# Patient Record
Sex: FEMALE | Race: BLACK OR AFRICAN AMERICAN | NOT HISPANIC OR LATINO | Employment: STUDENT | URBAN - METROPOLITAN AREA
[De-identification: names, ages, dates, MRNs, and addresses within clinical notes are randomized per-mention and may not be internally consistent; named-entity substitution may affect disease eponyms.]

---

## 2018-10-06 ENCOUNTER — HOSPITAL ENCOUNTER (EMERGENCY)
Facility: OTHER | Age: 18
Discharge: HOME | End: 2018-10-06
Attending: EMERGENCY MEDICINE
Payer: MEDICAID

## 2018-10-06 VITALS
OXYGEN SATURATION: 99 % | TEMPERATURE: 99 F | HEIGHT: 66 IN | RESPIRATION RATE: 16 BRPM | HEART RATE: 90 BPM | BODY MASS INDEX: 27.8 KG/M2 | WEIGHT: 173 LBS | DIASTOLIC BLOOD PRESSURE: 66 MMHG | SYSTOLIC BLOOD PRESSURE: 102 MMHG

## 2018-10-06 DIAGNOSIS — F12.920: Primary | ICD-10-CM

## 2018-10-06 LAB
B-HCG UR QL: NEGATIVE
CTP QC/QA: YES
GLUCOSE SERPL-MCNC: 130 MG/DL (ref 70–110)
POCT GLUCOSE: 130 MG/DL (ref 70–110)

## 2018-10-06 PROCEDURE — 25000003 PHARM REV CODE 250: Performed by: EMERGENCY MEDICINE

## 2018-10-06 PROCEDURE — 99283 EMERGENCY DEPT VISIT LOW MDM: CPT

## 2018-10-06 PROCEDURE — 81025 URINE PREGNANCY TEST: CPT | Performed by: EMERGENCY MEDICINE

## 2018-10-06 PROCEDURE — 82962 GLUCOSE BLOOD TEST: CPT

## 2018-10-06 RX ORDER — ONDANSETRON 2 MG/ML
4 INJECTION INTRAMUSCULAR; INTRAVENOUS
Status: DISCONTINUED | OUTPATIENT
Start: 2018-10-06 | End: 2018-10-06

## 2018-10-06 RX ORDER — ONDANSETRON 8 MG/1
8 TABLET, ORALLY DISINTEGRATING ORAL
Status: COMPLETED | OUTPATIENT
Start: 2018-10-06 | End: 2018-10-06

## 2018-10-06 RX ADMIN — ONDANSETRON 8 MG: 8 TABLET, ORALLY DISINTEGRATING ORAL at 01:10

## 2018-10-06 NOTE — ED NOTES
Pt lying in bed comfortably, call bell within reach, respirations even, unlabored, NAD noted, eyes open spontaneously. Pt updated on plan of care, will continue to monitor with cardiac monitoring, pulse ox, and bp cycling

## 2018-10-06 NOTE — ED TRIAGE NOTES
"Pt arrives to ED with c/o nausea and vomiting. Pt states "i ate cheese its, I was feeling fine before I ate it. My chest is burning, my mouth feels dry. I have been vomiting. I ate them 4 hours ago.i feel lightheaded and weird."  EMS reports pt eating TCH. PT vomiting in [place]. Pt lying in bed, respirations even, unlabored, eyes open spontaneously, pt able to speak in complete sentences. PT AAOx4, placed on pulse ox and BP cycling.   "

## 2018-10-06 NOTE — ED PROVIDER NOTES
"Encounter Date: 10/6/2018    SCRIBE #1 NOTE: I, Jennifer Krishnamurthy, am scribing for, and in the presence of, Dr. Gomez.       History     Chief Complaint   Patient presents with    Anxiety     Pt came to the ED tonight c.o. of anxiety attack s/p eating edibles tonight.      Time seen by provider: 12:50 AM    This is a 18 y.o. female who presents with complaint of N/V since eating "edible" cheez-its four hours ago. She reports dry mouth, "burning" chest pain, and light headedness. No SOB. She denies use of edibles in the past.      The history is provided by the patient.     Review of patient's allergies indicates:  No Known Allergies  Past Medical History:   Diagnosis Date    Asthma      History reviewed. No pertinent surgical history.  History reviewed. No pertinent family history.  Social History     Tobacco Use    Smoking status: Never Smoker   Substance Use Topics    Alcohol use: No    Drug use: Not on file     Review of Systems   Constitutional: Negative for fever.        Positive for dry mouth.   HENT: Negative for sore throat.    Respiratory: Negative for shortness of breath.    Cardiovascular: Positive for chest pain.   Gastrointestinal: Positive for nausea and vomiting. Negative for abdominal pain.   Genitourinary: Negative for dysuria.   Musculoskeletal: Negative for back pain.   Skin: Negative for rash.   Neurological: Positive for light-headedness. Negative for weakness.   Hematological: Does not bruise/bleed easily.       Physical Exam     Initial Vitals [10/06/18 0036]   BP Pulse Resp Temp SpO2   137/78 (!) 111 18 98.4 °F (36.9 °C) 100 %      MAP       --         Physical Exam    Nursing note and vitals reviewed.  Constitutional: She appears well-developed and well-nourished. She is not diaphoretic. No distress.   HENT:   Head: Normocephalic and atraumatic.   Eyes: EOM are normal. No scleral icterus.   Neck: Normal range of motion. Neck supple.   Cardiovascular: Normal rate, regular rhythm and normal " "heart sounds. Exam reveals no gallop and no friction rub.    No murmur heard.  Pulmonary/Chest: Breath sounds normal. No respiratory distress. She has no wheezes. She has no rhonchi. She has no rales.   Abdominal: Soft. Bowel sounds are normal. She exhibits no distension. There is no tenderness. There is no rebound and no guarding.   Musculoskeletal: Normal range of motion. She exhibits no edema or tenderness.   Lymphadenopathy:     She has no cervical adenopathy.   Neurological: She is alert and oriented to person, place, and time. GCS eye subscore is 4. GCS verbal subscore is 5. GCS motor subscore is 6.   Skin: Skin is warm and dry. No rash noted. No erythema.         ED Course   Procedures  Labs Reviewed   POCT GLUCOSE - Abnormal; Notable for the following components:       Result Value    POCT Glucose 130 (*)     All other components within normal limits   POCT URINE PREGNANCY   POCT GLUCOSE MONITORING CONTINUOUS          Imaging Results    None          Medical Decision Making:   Clinical Tests:   Lab Tests: Ordered and Reviewed    Additional MDM:   Comments: 18-year-old healthy female presents tachycardic via EMS for evaluation status post nausea vomiting after eating "edibles" for the 1st time tonight.  She reports being or other baseline prior to this.  Exam was unremarkable.  Patient will be observed on a cardiac monitor until clinically sober.  Zofran ODT given.    3:51 AM  Patient alert and oriented and asymptomatic.  Able to ambulate without difficulty.  She will be discharged home at this time stable condition..          Scribe Attestation:   Scribe #1: I performed the above scribed service and the documentation accurately describes the services I performed. I attest to the accuracy of the note.    Attending Attestation:           Physician Attestation for Scribe:  Physician Attestation Statement for Scribe #1: I, Dr. Gomez, reviewed documentation, as scribed by Jennifer Krishnamurthy in my presence, and it is " both accurate and complete.                    Clinical Impression:     1. Intoxication with marijuana, uncomplicated                                 Juanita Gomez MD  10/06/18 5673

## 2024-08-19 ENCOUNTER — HOSPITAL ENCOUNTER (EMERGENCY)
Facility: HOSPITAL | Age: 24
Discharge: HOME | End: 2024-08-19
Attending: EMERGENCY MEDICINE

## 2024-08-19 VITALS
DIASTOLIC BLOOD PRESSURE: 82 MMHG | TEMPERATURE: 98 F | WEIGHT: 232 LBS | BODY MASS INDEX: 38.65 KG/M2 | RESPIRATION RATE: 19 BRPM | SYSTOLIC BLOOD PRESSURE: 131 MMHG | HEART RATE: 73 BPM | HEIGHT: 65 IN | OXYGEN SATURATION: 100 %

## 2024-08-19 DIAGNOSIS — R07.9 CHEST PAIN, UNSPECIFIED TYPE: Primary | ICD-10-CM

## 2024-08-19 DIAGNOSIS — R07.9 CHEST PAIN: ICD-10-CM

## 2024-08-19 LAB
ALBUMIN SERPL-MCNC: 3.6 G/DL (ref 3.3–5.5)
ALP SERPL-CCNC: 63 U/L (ref 42–141)
B-HCG UR QL: NEGATIVE
BILIRUB SERPL-MCNC: 0.7 MG/DL (ref 0.2–1.6)
BUN SERPL-MCNC: 8 MG/DL (ref 7–22)
CALCIUM SERPL-MCNC: 9.4 MG/DL (ref 8–10.3)
CHLORIDE SERPL-SCNC: 106 MMOL/L (ref 98–108)
CREAT SERPL-MCNC: 0.6 MG/DL (ref 0.6–1.2)
CTP QC/QA: YES
GLUCOSE SERPL-MCNC: 83 MG/DL (ref 73–118)
HCT, POC: NORMAL
HGB, POC: NORMAL (ref 14–18)
MCH, POC: NORMAL
MCHC, POC: NORMAL
MCV, POC: NORMAL
MPV, POC: NORMAL
POC ALT (SGPT): 18 U/L (ref 10–47)
POC AST (SGOT): 24 U/L (ref 11–38)
POC CARDIAC TROPONIN I: 0 NG/ML (ref 0–0.08)
POC PLATELET COUNT: NORMAL
POC TCO2: 30 MMOL/L (ref 18–33)
POTASSIUM BLD-SCNC: 3.9 MMOL/L (ref 3.6–5.1)
PROTEIN, POC: 7.3 G/DL (ref 6.4–8.1)
RBC, POC: NORMAL
RDW, POC: NORMAL
SAMPLE: NORMAL
SODIUM BLD-SCNC: 138 MMOL/L (ref 128–145)
WBC, POC: NORMAL

## 2024-08-19 PROCEDURE — 63600175 PHARM REV CODE 636 W HCPCS: Mod: ER | Performed by: EMERGENCY MEDICINE

## 2024-08-19 PROCEDURE — 99285 EMERGENCY DEPT VISIT HI MDM: CPT | Mod: 25,ER

## 2024-08-19 PROCEDURE — 80053 COMPREHEN METABOLIC PANEL: CPT | Mod: ER

## 2024-08-19 PROCEDURE — 84484 ASSAY OF TROPONIN QUANT: CPT | Mod: ER

## 2024-08-19 PROCEDURE — 96374 THER/PROPH/DIAG INJ IV PUSH: CPT | Mod: ER

## 2024-08-19 PROCEDURE — 81025 URINE PREGNANCY TEST: CPT | Mod: ER

## 2024-08-19 PROCEDURE — 85025 COMPLETE CBC W/AUTO DIFF WBC: CPT | Mod: ER

## 2024-08-19 PROCEDURE — 81025 URINE PREGNANCY TEST: CPT | Mod: ER | Performed by: EMERGENCY MEDICINE

## 2024-08-19 RX ORDER — KETOROLAC TROMETHAMINE 30 MG/ML
15 INJECTION, SOLUTION INTRAMUSCULAR; INTRAVENOUS
Status: COMPLETED | OUTPATIENT
Start: 2024-08-19 | End: 2024-08-19

## 2024-08-19 RX ORDER — IBUPROFEN 600 MG/1
600 TABLET ORAL EVERY 6 HOURS PRN
Qty: 20 TABLET | Refills: 0 | Status: SHIPPED | OUTPATIENT
Start: 2024-08-19

## 2024-08-19 RX ORDER — METHOCARBAMOL 750 MG/1
1500 TABLET, FILM COATED ORAL NIGHTLY
Qty: 20 TABLET | Refills: 0 | Status: SHIPPED | OUTPATIENT
Start: 2024-08-19 | End: 2024-08-29

## 2024-08-19 RX ORDER — ORPHENADRINE CITRATE 30 MG/ML
30 INJECTION INTRAMUSCULAR; INTRAVENOUS
Status: DISCONTINUED | OUTPATIENT
Start: 2024-08-19 | End: 2024-08-19 | Stop reason: HOSPADM

## 2024-08-19 RX ADMIN — KETOROLAC TROMETHAMINE 15 MG: 30 INJECTION, SOLUTION INTRAMUSCULAR at 05:08

## 2024-08-19 NOTE — ED PROVIDER NOTES
Encounter Date: 8/19/2024    SCRIBE #1 NOTE: I, Huong Espana, am scribing for, and in the presence of,  Alessandra Delgado MD.       History     Chief Complaint   Patient presents with    Chest Pain     Left upper chest pain, onset x 3 days last week,steady, then resolved, returned yesterday. Denies other sx     Camila Duncan is a 24 y.o. female, with a past medical history of asthma, who presents to the ED with upper left sided chest pain described as tightness that began last week. Patient states the pain was constant for 3 days last week, resolved, and returned yesterday. Patient states the pain is exacerbated with deep inspiration and certain movements. Patient reports associated shortness of breath on exertion and palpitations on exertion, most noticeable at night. Patient denies leg swelling, loss of consciousness, nausea, or sweating. Patient denies taking any daily medications. Patient denies history of anemia. Patient denies smoking tobacco or EtOH usage. NKDA.       The history is provided by the patient. No  was used.     Review of patient's allergies indicates:  No Known Allergies  Past Medical History:   Diagnosis Date    Asthma      History reviewed. No pertinent surgical history.  No family history on file.  Social History     Tobacco Use    Smoking status: Never   Substance Use Topics    Alcohol use: No     Review of Systems   Constitutional:  Negative for activity change, appetite change, chills, diaphoresis and fever.   HENT:  Negative for congestion, rhinorrhea, sneezing and sore throat.    Respiratory:  Positive for shortness of breath (on exertion). Negative for cough, choking and wheezing.    Cardiovascular:  Positive for chest pain (left sided, upper) and palpitations (on exertion). Negative for leg swelling.   Gastrointestinal:  Negative for abdominal pain, diarrhea, nausea and vomiting.   Neurological:  Negative for dizziness, syncope, light-headedness and headaches.    All other systems reviewed and are negative.      Physical Exam     Initial Vitals [08/19/24 1441]   BP Pulse Resp Temp SpO2   131/82 73 19 98.3 °F (36.8 °C) 100 %      MAP       --         Physical Exam    Nursing note and vitals reviewed.  Constitutional: She appears well-developed and well-nourished. No distress.   HENT:   Head: Normocephalic and atraumatic.   Eyes: Conjunctivae are normal.   Neck:   Normal range of motion.  Cardiovascular:  Normal rate, regular rhythm and normal heart sounds.           No murmur heard.  Pulmonary/Chest: Effort normal and breath sounds normal. No respiratory distress.   Abdominal: Bowel sounds are normal. She exhibits no distension.   Musculoskeletal:         General: Normal range of motion.      Cervical back: Normal range of motion.     Neurological: She is alert and oriented to person, place, and time.   Skin: Skin is warm and dry.   Psychiatric: She has a normal mood and affect. Her behavior is normal.         ED Course   Procedures  Labs Reviewed   TROPONIN ISTAT       Result Value    POC Cardiac Troponin I 0.00      Sample unknown     POCT URINE PREGNANCY    POC Preg Test, Ur Negative       Acceptable Yes     POCT CBC    Hematocrit        Hemoglobin        RBC        WBC        MCV        MCH, POC        MCHC        RDW-CV        Platelet Count, POC        MPV       POCT CMP   POCT TROPONIN   POCT CMP    Albumin, POC 3.6      Alkaline Phosphatase, POC 63      ALT (SGPT), POC 18      AST (SGOT), POC 24      POC BUN 8      Calcium, POC 9.4      POC Chloride 106      POC Creatinine 0.6      POC Glucose 83      POC Potassium 3.9      POC Sodium 138      Bilirubin, POC 0.7      POC TCO2 30      Protein, POC 7.3       EKG Readings: (Independently Interpreted)   Initial Reading: No STEMI. Rhythm: Normal Sinus Rhythm. Heart Rate: 70. Ectopy: No Ectopy. Conduction: Normal. ST Segments: Normal ST Segments. T Waves Flipped: III. Clinical Impression: Normal Sinus  Rhythm Other Impression: independently interpreted by me       Imaging Results              X-Ray Chest PA And Lateral (Final result)  Result time 08/19/24 15:33:34      Final result by Juaquin Castro MD (08/19/24 15:33:34)                   Impression:      No acute process.      Electronically signed by: Juaquin Castro MD  Date:    08/19/2024  Time:    15:33               Narrative:    EXAMINATION:  XR CHEST PA AND LATERAL    CLINICAL HISTORY:  Chest pain, unspecified    TECHNIQUE:  PA and lateral views of the chest were performed.    COMPARISON:  04/06/2013.    FINDINGS:  The trachea is unremarkable.  The cardiomediastinal silhouette is within normal limits.  The hilar structures are unremarkable.  There is no evidence of free air beneath the hemidiaphragms.  There are no pleural effusions.  There is no evidence of a pneumothorax.  There is no evidence of pneumomediastinum.  No airspace opacity is present.  The osseous structures are unremarkable.                                       Medications   orphenadrine injection 30 mg (30 mg Intravenous Not Given 8/19/24 1701)   ketorolac injection 15 mg (15 mg Intravenous Given 8/19/24 1701)     Medical Decision Making  24F with asthma, presents with upper left sided chest pain, shortness of breath and palpitations on exertion. On exam, pt looks well, no abnormalities. In shared decision making with the patient I will order labs, imaging, and EKG. I considered but excluded arrhythmia, acute MI, pneumonia, pneumothorax and anemia in my differential diagnosis. Work up is normal. Pain may be inflammatory or MSK. Will treat with motrin and robaxin.     Amount and/or Complexity of Data Reviewed  Labs: ordered. Decision-making details documented in ED Course.  Radiology: ordered. Decision-making details documented in ED Course.  ECG/medicine tests: ordered and independent interpretation performed. Decision-making details documented in ED Course.    Risk  Prescription drug  management.            Scribe Attestation:   Scribe #1: I performed the above scribed service and the documentation accurately describes the services I performed. I attest to the accuracy of the note.                             I, Dr. Alessandra Delgado, personally performed the services described in this documentation.   All medical record entries made by the scribe were at my direction and in my presence.   I have reviewed the chart and agree that the record is accurate and complete.   Alessandra Delgado MD.  3:57 PM 08/19/2024     Clinical Impression:  Final diagnoses:  [R07.9] Chest pain, unspecified type (Primary)          ED Disposition Condition    Discharge Stable          ED Prescriptions       Medication Sig Dispense Start Date End Date Auth. Provider    ibuprofen (ADVIL,MOTRIN) 600 MG tablet Take 1 tablet (600 mg total) by mouth every 6 (six) hours as needed for Pain. 20 tablet 8/19/2024 -- Alessandra Delgado MD    methocarbamoL (ROBAXIN) 750 MG Tab Take 2 tablets (1,500 mg total) by mouth every evening. for 10 days 20 tablet 8/19/2024 8/29/2024 Alessandra Delgado MD          Follow-up Information       Follow up With Specialties Details Why Contact Info    Kourtney Dickey MD Pediatrics  If symptoms worsen [address] [place] [address]  [address]  [place] [address]  [phone]               Alessandra Delgado MD  08/19/24 9338

## 2024-08-19 NOTE — DISCHARGE INSTRUCTIONS
Your ER work up was normal - your heart and lungs look good. Your pain may be musculoskeletal or from inflammation. Take medications as directed. See your doctor if you are not better with this treatment.

## 2024-08-19 NOTE — Clinical Note
"Camila"Eleuterio Duncan was seen and treated in our emergency department on 8/19/2024.  She may return to work on 08/20/2024.       If you have any questions or concerns, please don't hesitate to call.      Alessandra Delgado MD"

## 2024-08-20 LAB
OHS QRS DURATION: 104 MS
OHS QTC CALCULATION: 438 MS